# Patient Record
(demographics unavailable — no encounter records)

---

## 2024-10-14 NOTE — ASSESSMENT
[FreeTextEntry1] : We discussed possibilities including nocturnal LPR or a tenacious virus; trial of reflux rx & rtc 1 month sooner prn. We reviewed reflux precautions and the patient was provided with the corresponding educational handout.

## 2024-10-14 NOTE — PROCEDURE
[de-identified] : Indication: requirement for exam not possible via anterior examination; sore throat After verbal consent and the administration of an aerosolized oxymetazoline/lidocaine mix, examination was performed with a flexible endoscope placed through the nose which was attached to a video monitoring system. Findings: Nasopharynx: unremarkable Soft palate, lateral and posterior pharyngeal walls: unremarkable Base of tongue & lingual tonsil: minimal retrodisplacement Vallecula: unremarkable Epiglottis: unremarkable Piriform sinuses and pharyngoesophageal junction: unremarkable Arytenoids and AE folds: mild-mod interarytenoid edema Ventricle and false vocal folds: unremarkable True vocal folds: Smooth free edge; surface without ectasias or edema; normal movement bilaterally with good apposition in phonation Visible subglottis: unremarkable Narrow-band imaging: not used Other findings: none

## 2024-10-14 NOTE — HISTORY OF PRESENT ILLNESS
[de-identified] : Two and a half months of sore throat; she tried 2 wks of famotidine which didn't seem to help. No micky reflux. She's had strep a few times but not recently. Swallowing hurts but no dysphagia; the sore throat is worse in the mornings.  Seasonal allergies worse in the spring. She tried claritin & flonase for a while also with no improvement of the throat sxs.

## 2024-10-14 NOTE — PHYSICAL EXAM
[Laryngoscopy Performed] : laryngoscopy was performed, see procedure section for findings [Normal] : no masses and lesions seen, face is symmetric

## 2024-10-14 NOTE — CONSULT LETTER
[Dear  ___] : Dear  [unfilled], [Consult Letter:] : I had the pleasure of evaluating your patient, [unfilled]. [Please see my note below.] : Please see my note below. [Consult Closing:] : Thank you very much for allowing me to participate in the care of this patient.  If you have any questions, please do not hesitate to contact me. [Sincerely,] : Sincerely, [FreeTextEntry3] : RAMIN Parks Jr, MD, FAAOHNS Otolaryngologist [facility]